# Patient Record
Sex: FEMALE | Race: WHITE | Employment: FULL TIME | URBAN - METROPOLITAN AREA
[De-identification: names, ages, dates, MRNs, and addresses within clinical notes are randomized per-mention and may not be internally consistent; named-entity substitution may affect disease eponyms.]

---

## 2017-02-05 DIAGNOSIS — J45.20 MILD INTERMITTENT ASTHMA WITHOUT COMPLICATION: ICD-10-CM

## 2017-02-05 DIAGNOSIS — J30.9 ALLERGIC RHINITIS: ICD-10-CM

## 2017-02-06 RX ORDER — MONTELUKAST SODIUM 10 MG/1
TABLET ORAL
Qty: 90 TABLET | Refills: 2 | Status: SHIPPED | OUTPATIENT
Start: 2017-02-06 | End: 2017-11-05 | Stop reason: SDUPTHER

## 2017-05-25 ENCOUNTER — OFFICE VISIT (OUTPATIENT)
Dept: FAMILY MEDICINE CLINIC | Age: 32
End: 2017-05-25

## 2017-05-25 VITALS
SYSTOLIC BLOOD PRESSURE: 110 MMHG | OXYGEN SATURATION: 99 % | HEIGHT: 65 IN | HEART RATE: 73 BPM | BODY MASS INDEX: 26.91 KG/M2 | WEIGHT: 161.5 LBS | DIASTOLIC BLOOD PRESSURE: 72 MMHG

## 2017-05-25 DIAGNOSIS — F41.9 ANXIETY: Primary | ICD-10-CM

## 2017-05-25 PROCEDURE — 99213 OFFICE O/P EST LOW 20 MIN: CPT | Performed by: FAMILY MEDICINE

## 2017-05-25 RX ORDER — CITALOPRAM 20 MG/1
20 TABLET ORAL DAILY
Qty: 90 TABLET | Refills: 3 | Status: SHIPPED | OUTPATIENT
Start: 2017-05-25 | End: 2018-05-11 | Stop reason: SDUPTHER

## 2017-05-25 ASSESSMENT — PATIENT HEALTH QUESTIONNAIRE - PHQ9
SUM OF ALL RESPONSES TO PHQ9 QUESTIONS 1 & 2: 2
1. LITTLE INTEREST OR PLEASURE IN DOING THINGS: 1
2. FEELING DOWN, DEPRESSED OR HOPELESS: 1
SUM OF ALL RESPONSES TO PHQ QUESTIONS 1-9: 2

## 2017-11-05 DIAGNOSIS — J30.9 ALLERGIC RHINITIS: ICD-10-CM

## 2017-11-05 DIAGNOSIS — J45.20 MILD INTERMITTENT ASTHMA WITHOUT COMPLICATION: ICD-10-CM

## 2017-11-06 RX ORDER — MONTELUKAST SODIUM 10 MG/1
TABLET ORAL
Qty: 90 TABLET | Refills: 1 | Status: SHIPPED | OUTPATIENT
Start: 2017-11-06 | End: 2018-05-11 | Stop reason: SDUPTHER

## 2018-03-15 ENCOUNTER — OFFICE VISIT (OUTPATIENT)
Dept: FAMILY MEDICINE CLINIC | Age: 33
End: 2018-03-15

## 2018-03-15 VITALS
HEART RATE: 57 BPM | SYSTOLIC BLOOD PRESSURE: 96 MMHG | BODY MASS INDEX: 27.99 KG/M2 | TEMPERATURE: 97.8 F | OXYGEN SATURATION: 97 % | DIASTOLIC BLOOD PRESSURE: 72 MMHG | WEIGHT: 165.6 LBS

## 2018-03-15 DIAGNOSIS — J45.909 UNCOMPLICATED ASTHMA, UNSPECIFIED ASTHMA SEVERITY, UNSPECIFIED WHETHER PERSISTENT: Primary | ICD-10-CM

## 2018-03-15 DIAGNOSIS — K29.70 GASTRITIS WITHOUT BLEEDING, UNSPECIFIED CHRONICITY, UNSPECIFIED GASTRITIS TYPE: ICD-10-CM

## 2018-03-15 DIAGNOSIS — J30.2 SEASONAL ALLERGIC RHINITIS, UNSPECIFIED CHRONICITY, UNSPECIFIED TRIGGER: ICD-10-CM

## 2018-03-15 PROCEDURE — 99213 OFFICE O/P EST LOW 20 MIN: CPT | Performed by: FAMILY MEDICINE

## 2018-03-15 PROCEDURE — G8419 CALC BMI OUT NRM PARAM NOF/U: HCPCS | Performed by: FAMILY MEDICINE

## 2018-03-15 PROCEDURE — G8484 FLU IMMUNIZE NO ADMIN: HCPCS | Performed by: FAMILY MEDICINE

## 2018-03-15 PROCEDURE — G8427 DOCREV CUR MEDS BY ELIG CLIN: HCPCS | Performed by: FAMILY MEDICINE

## 2018-03-15 PROCEDURE — 1036F TOBACCO NON-USER: CPT | Performed by: FAMILY MEDICINE

## 2018-03-15 RX ORDER — RANITIDINE 150 MG/1
150 TABLET ORAL 2 TIMES DAILY
Qty: 60 TABLET | Refills: 0 | Status: SHIPPED | OUTPATIENT
Start: 2018-03-15 | End: 2018-07-18

## 2018-03-15 RX ORDER — ALBUTEROL SULFATE 90 UG/1
2 AEROSOL, METERED RESPIRATORY (INHALATION) EVERY 6 HOURS PRN
Qty: 1 INHALER | Refills: 3 | Status: SHIPPED | OUTPATIENT
Start: 2018-03-15 | End: 2019-07-22 | Stop reason: CLARIF

## 2018-03-15 NOTE — PROGRESS NOTES
She is oriented to person, place, and time. She appears well-developed and well-nourished. No distress. Cardiovascular: Normal rate, regular rhythm and normal heart sounds. No murmur heard. Pulmonary/Chest: Effort normal and breath sounds normal. She has no wheezes. She has no rales. Neurological: She is alert and oriented to person, place, and time. Psychiatric: She has a normal mood and affect. Her behavior is normal.     Assessment:     1. Uncomplicated asthma, unspecified asthma severity, unspecified whether persistent  albuterol sulfate HFA (PROVENTIL HFA) 108 (90 Base) MCG/ACT inhaler   2. Seasonal allergic rhinitis, unspecified chronicity, unspecified trigger     3. Gastritis without bleeding, unspecified chronicity, unspecified gastritis type  ranitidine (ZANTAC) 150 MG tablet     Plan:     Uncertain etiology of symptoms. Improving. Viral URI vs mild asthma exacerbation with increased training or secondary to GERD. Use albuterol prn. Take zantac bid over the next 2 weeks. Use zyrtec and Singulair regularly. Call with update if no continued improvement over the next week.

## 2018-05-11 DIAGNOSIS — J30.9 ALLERGIC RHINITIS, UNSPECIFIED CHRONICITY, UNSPECIFIED SEASONALITY, UNSPECIFIED TRIGGER: ICD-10-CM

## 2018-05-11 DIAGNOSIS — J45.20 MILD INTERMITTENT ASTHMA WITHOUT COMPLICATION: ICD-10-CM

## 2018-05-11 RX ORDER — CITALOPRAM 20 MG/1
20 TABLET ORAL DAILY
Qty: 30 TABLET | Refills: 0 | Status: SHIPPED | OUTPATIENT
Start: 2018-05-11 | End: 2018-06-24 | Stop reason: SDUPTHER

## 2018-05-11 RX ORDER — MONTELUKAST SODIUM 10 MG/1
TABLET ORAL
Qty: 30 TABLET | Refills: 0 | Status: SHIPPED | OUTPATIENT
Start: 2018-05-11 | End: 2018-06-14 | Stop reason: SDUPTHER

## 2018-06-11 DIAGNOSIS — J30.9 ALLERGIC RHINITIS, UNSPECIFIED CHRONICITY, UNSPECIFIED SEASONALITY, UNSPECIFIED TRIGGER: ICD-10-CM

## 2018-06-11 DIAGNOSIS — J45.20 MILD INTERMITTENT ASTHMA WITHOUT COMPLICATION: ICD-10-CM

## 2018-06-11 RX ORDER — MONTELUKAST SODIUM 10 MG/1
TABLET ORAL
Qty: 30 TABLET | Refills: 0 | OUTPATIENT
Start: 2018-06-11

## 2018-06-14 DIAGNOSIS — J30.9 ALLERGIC RHINITIS, UNSPECIFIED CHRONICITY, UNSPECIFIED SEASONALITY, UNSPECIFIED TRIGGER: ICD-10-CM

## 2018-06-14 DIAGNOSIS — J45.20 MILD INTERMITTENT ASTHMA WITHOUT COMPLICATION: ICD-10-CM

## 2018-06-15 RX ORDER — MONTELUKAST SODIUM 10 MG/1
TABLET ORAL
Qty: 30 TABLET | Refills: 12 | Status: SHIPPED | OUTPATIENT
Start: 2018-06-15 | End: 2018-07-18 | Stop reason: SDUPTHER

## 2018-06-26 RX ORDER — CITALOPRAM 20 MG/1
TABLET ORAL
Qty: 30 TABLET | Refills: 0 | Status: SHIPPED | OUTPATIENT
Start: 2018-06-26 | End: 2018-07-18 | Stop reason: SDUPTHER

## 2018-07-16 RX ORDER — CITALOPRAM 20 MG/1
TABLET ORAL
Qty: 30 TABLET | Refills: 0 | OUTPATIENT
Start: 2018-07-16

## 2018-07-18 ENCOUNTER — OFFICE VISIT (OUTPATIENT)
Dept: FAMILY MEDICINE CLINIC | Age: 33
End: 2018-07-18

## 2018-07-18 VITALS
SYSTOLIC BLOOD PRESSURE: 110 MMHG | BODY MASS INDEX: 28.16 KG/M2 | WEIGHT: 169 LBS | OXYGEN SATURATION: 98 % | HEIGHT: 65 IN | DIASTOLIC BLOOD PRESSURE: 60 MMHG | HEART RATE: 62 BPM

## 2018-07-18 DIAGNOSIS — Z00.00 PHYSICAL EXAM, ROUTINE: Primary | ICD-10-CM

## 2018-07-18 DIAGNOSIS — F41.9 ANXIETY: ICD-10-CM

## 2018-07-18 DIAGNOSIS — J30.2 SEASONAL ALLERGIC RHINITIS, UNSPECIFIED CHRONICITY, UNSPECIFIED TRIGGER: ICD-10-CM

## 2018-07-18 DIAGNOSIS — J30.9 ALLERGIC RHINITIS, UNSPECIFIED CHRONICITY, UNSPECIFIED SEASONALITY, UNSPECIFIED TRIGGER: ICD-10-CM

## 2018-07-18 DIAGNOSIS — J45.20 MILD INTERMITTENT ASTHMA WITHOUT COMPLICATION: ICD-10-CM

## 2018-07-18 PROCEDURE — 99395 PREV VISIT EST AGE 18-39: CPT | Performed by: FAMILY MEDICINE

## 2018-07-18 RX ORDER — CITALOPRAM 20 MG/1
TABLET ORAL
Qty: 90 TABLET | Refills: 3 | Status: SHIPPED | OUTPATIENT
Start: 2018-07-18 | End: 2018-07-20 | Stop reason: DRUGHIGH

## 2018-07-18 RX ORDER — MONTELUKAST SODIUM 10 MG/1
10 TABLET ORAL NIGHTLY
Qty: 90 TABLET | Refills: 3 | Status: SHIPPED | OUTPATIENT
Start: 2018-07-18 | End: 2019-07-11 | Stop reason: SDUPTHER

## 2018-07-18 ASSESSMENT — PATIENT HEALTH QUESTIONNAIRE - PHQ9
SUM OF ALL RESPONSES TO PHQ QUESTIONS 1-9: 0
2. FEELING DOWN, DEPRESSED OR HOPELESS: 0
1. LITTLE INTEREST OR PLEASURE IN DOING THINGS: 0
SUM OF ALL RESPONSES TO PHQ9 QUESTIONS 1 & 2: 0

## 2018-07-18 NOTE — PROGRESS NOTES
History and Physical      Zoey Jones  YOB: 1985    Date of Service:  7/18/2018    Chief Complaint:   Zoey Jones is a 34 y.o. female who presents for complete physical examination. HPI:     Patient here for yearly routine visit. Hx of seasonal allergies which trigger asthma. Takes Singulair nightly during spring and summer which helps. Takes zyrtec daily. Uses albuterol prn but this is rare. No trouble with exercise which is running a few miles at least once a week. On Celexa for anxiety. This has helped although she still struggles some with anxiety (work situations, financial stress, etc.)  Seeing a counselor through work starting tomorrow but does not think that she wants to increase Celexa right now. No thoughts of self harm or hurting others.       Wt Readings from Last 3 Encounters:   07/18/18 169 lb (76.7 kg)   03/15/18 165 lb 9.6 oz (75.1 kg)   05/25/17 161 lb 8 oz (73.3 kg)     BP Readings from Last 3 Encounters:   07/18/18 110/60   03/15/18 96/72   05/25/17 110/72       Patient Active Problem List   Diagnosis    Asthma    Allergic rhinitis    Onychomycosis       Preventive Care:  Health Maintenance   Topic Date Due    HIV screen  09/30/1999    DTaP/Tdap/Td vaccine (1 - Tdap) 09/30/2003    Pneumococcal med risk (1 of 1 - PPSV23) 09/30/2003    Cervical cancer screen  09/30/2005    Flu vaccine (1) 09/01/2018        Lipid panel:   Lab Results   Component Value Date    CHOL 166 02/12/2016    TRIG 60 02/12/2016    HDL 58 02/12/2016    LDLCALC 96 02/12/2016        Immunization History   Administered Date(s) Administered    Influenza Whole 10/15/2015       Allergies   Allergen Reactions    Other      SEA WEED  CATS    Penicillins      Outpatient Prescriptions Marked as Taking for the 7/18/18 encounter (Office Visit) with Kemar Woodward MD   Medication Sig Dispense Refill    citalopram (CELEXA) 20 MG tablet TAKE ONE TABLET BY MOUTH DAILY 90 tablet 3   

## 2018-07-19 ENCOUNTER — TELEPHONE (OUTPATIENT)
Dept: FAMILY MEDICINE CLINIC | Age: 33
End: 2018-07-19

## 2018-07-20 RX ORDER — CITALOPRAM 40 MG/1
40 TABLET ORAL DAILY
Qty: 90 TABLET | Refills: 3 | Status: SHIPPED | OUTPATIENT
Start: 2018-07-20 | End: 2019-07-22 | Stop reason: SDUPTHER

## 2018-10-15 ENCOUNTER — OFFICE VISIT (OUTPATIENT)
Dept: FAMILY MEDICINE CLINIC | Age: 33
End: 2018-10-15
Payer: COMMERCIAL

## 2018-10-15 VITALS
BODY MASS INDEX: 29.29 KG/M2 | HEART RATE: 60 BPM | WEIGHT: 176 LBS | OXYGEN SATURATION: 98 % | SYSTOLIC BLOOD PRESSURE: 104 MMHG | DIASTOLIC BLOOD PRESSURE: 70 MMHG

## 2018-10-15 DIAGNOSIS — S99.922A TOE INJURY, LEFT, INITIAL ENCOUNTER: Primary | ICD-10-CM

## 2018-10-15 PROCEDURE — 1036F TOBACCO NON-USER: CPT | Performed by: FAMILY MEDICINE

## 2018-10-15 PROCEDURE — 99213 OFFICE O/P EST LOW 20 MIN: CPT | Performed by: FAMILY MEDICINE

## 2018-10-15 PROCEDURE — G8419 CALC BMI OUT NRM PARAM NOF/U: HCPCS | Performed by: FAMILY MEDICINE

## 2018-10-15 PROCEDURE — G8484 FLU IMMUNIZE NO ADMIN: HCPCS | Performed by: FAMILY MEDICINE

## 2018-10-15 PROCEDURE — G8427 DOCREV CUR MEDS BY ELIG CLIN: HCPCS | Performed by: FAMILY MEDICINE

## 2018-10-15 ASSESSMENT — ENCOUNTER SYMPTOMS: RESPIRATORY NEGATIVE: 1

## 2019-07-11 DIAGNOSIS — J30.9 ALLERGIC RHINITIS: ICD-10-CM

## 2019-07-11 DIAGNOSIS — J45.20 MILD INTERMITTENT ASTHMA WITHOUT COMPLICATION: ICD-10-CM

## 2019-07-11 RX ORDER — MONTELUKAST SODIUM 10 MG/1
10 TABLET ORAL NIGHTLY
Qty: 30 TABLET | Refills: 0 | Status: SHIPPED | OUTPATIENT
Start: 2019-07-11 | End: 2019-07-22 | Stop reason: SDUPTHER

## 2019-07-22 ENCOUNTER — OFFICE VISIT (OUTPATIENT)
Dept: FAMILY MEDICINE CLINIC | Age: 34
End: 2019-07-22
Payer: COMMERCIAL

## 2019-07-22 VITALS
BODY MASS INDEX: 29.26 KG/M2 | HEIGHT: 65 IN | DIASTOLIC BLOOD PRESSURE: 72 MMHG | WEIGHT: 175.6 LBS | SYSTOLIC BLOOD PRESSURE: 108 MMHG | OXYGEN SATURATION: 98 % | HEART RATE: 47 BPM

## 2019-07-22 DIAGNOSIS — Z00.00 PHYSICAL EXAM, ROUTINE: Primary | ICD-10-CM

## 2019-07-22 DIAGNOSIS — I95.9 HYPOTENSION, UNSPECIFIED HYPOTENSION TYPE: ICD-10-CM

## 2019-07-22 DIAGNOSIS — J45.20 MILD INTERMITTENT ASTHMA WITHOUT COMPLICATION: ICD-10-CM

## 2019-07-22 DIAGNOSIS — J45.909 UNCOMPLICATED ASTHMA, UNSPECIFIED ASTHMA SEVERITY, UNSPECIFIED WHETHER PERSISTENT: ICD-10-CM

## 2019-07-22 DIAGNOSIS — F41.9 ANXIETY: ICD-10-CM

## 2019-07-22 DIAGNOSIS — J30.9 ALLERGIC RHINITIS, UNSPECIFIED SEASONALITY, UNSPECIFIED TRIGGER: ICD-10-CM

## 2019-07-22 PROCEDURE — 99395 PREV VISIT EST AGE 18-39: CPT | Performed by: FAMILY MEDICINE

## 2019-07-22 RX ORDER — MONTELUKAST SODIUM 10 MG/1
10 TABLET ORAL NIGHTLY
Qty: 90 TABLET | Refills: 3 | Status: SHIPPED | OUTPATIENT
Start: 2019-07-22 | End: 2020-08-13 | Stop reason: SDUPTHER

## 2019-07-22 RX ORDER — CITALOPRAM 40 MG/1
40 TABLET ORAL DAILY
Qty: 90 TABLET | Refills: 3 | Status: SHIPPED | OUTPATIENT
Start: 2019-07-22 | End: 2020-08-06

## 2019-07-22 RX ORDER — ALBUTEROL SULFATE 90 UG/1
2 AEROSOL, METERED RESPIRATORY (INHALATION) EVERY 6 HOURS PRN
Qty: 1 INHALER | Refills: 3 | Status: SHIPPED | OUTPATIENT
Start: 2019-07-22

## 2019-07-22 RX ORDER — ALCOHOL ANTISEPTIC PADS
PADS, MEDICATED (EA) TOPICAL
COMMUNITY
End: 2020-08-06

## 2019-07-22 ASSESSMENT — PATIENT HEALTH QUESTIONNAIRE - PHQ9
2. FEELING DOWN, DEPRESSED OR HOPELESS: 0
SUM OF ALL RESPONSES TO PHQ9 QUESTIONS 1 & 2: 0
SUM OF ALL RESPONSES TO PHQ QUESTIONS 1-9: 0
SUM OF ALL RESPONSES TO PHQ QUESTIONS 1-9: 0
1. LITTLE INTEREST OR PLEASURE IN DOING THINGS: 0

## 2019-07-22 NOTE — PROGRESS NOTES
Penicillins      Outpatient Medications Marked as Taking for the 7/22/19 encounter (Office Visit) with Deirdre Pearce MD   Medication Sig Dispense Refill    B Complex-Biotin-FA (HM VITAMIN B50 COMPLEX) TABS Take by mouth      montelukast (SINGULAIR) 10 MG tablet Take 1 tablet by mouth nightly 30 tablet 0    citalopram (CELEXA) 40 MG tablet Take 1 tablet by mouth daily 90 tablet 3    [DISCONTINUED] albuterol sulfate HFA (PROVENTIL HFA) 108 (90 Base) MCG/ACT inhaler Inhale 2 puffs into the lungs every 6 hours as needed for Wheezing or Shortness of Breath 1 Inhaler 3    Multiple Vitamin (ONE-A-DAY ESSENTIAL PO) Take by mouth      cetirizine (ZYRTEC) 10 MG tablet Take 10 mg by mouth daily. Past Medical History:   Diagnosis Date    Allergic rhinitis     Asthma      Past Surgical History:   Procedure Laterality Date    URETHRAL STRICTURE DILATATION  1995     No family history on file. Social History     Socioeconomic History    Marital status:      Spouse name: Not on file    Number of children: Not on file    Years of education: Not on file    Highest education level: Not on file   Occupational History    Not on file   Social Needs    Financial resource strain: Not on file    Food insecurity:     Worry: Not on file     Inability: Not on file    Transportation needs:     Medical: Not on file     Non-medical: Not on file   Tobacco Use    Smoking status: Never Smoker    Smokeless tobacco: Never Used   Substance and Sexual Activity    Alcohol use:  Yes     Alcohol/week: 0.0 standard drinks     Comment: Occasionally    Drug use: No    Sexual activity: Yes     Partners: Male     Comment: ; expecting   Lifestyle    Physical activity:     Days per week: Not on file     Minutes per session: Not on file    Stress: Not on file   Relationships    Social connections:     Talks on phone: Not on file     Gets together: Not on file     Attends Church service: Not on file Active member of club or organization: Not on file     Attends meetings of clubs or organizations: Not on file     Relationship status: Not on file    Intimate partner violence:     Fear of current or ex partner: Not on file     Emotionally abused: Not on file     Physically abused: Not on file     Forced sexual activity: Not on file   Other Topics Concern    Not on file   Social History Narrative    2 children ages 11 and 1 in 2018       Review of Systems:  A comprehensive review of systems was negative except for what was noted in the HPI. Physical Exam:   Vitals:    07/22/19 0900   BP: 108/72   Site: Left Upper Arm   Position: Sitting   Cuff Size: Medium Adult   Pulse: (!) 47   SpO2: 98%   Weight: 175 lb 9.6 oz (79.7 kg)   Height: 5' 5.25\" (1.657 m)     Body mass index is 29 kg/m². Constitutional: She is oriented to person, place, and time. She appears well-developed and well-nourished. No distress. HEENT:   Head: Normocephalic and atraumatic. Right Ear: Tympanic membrane, external ear and ear canal normal.   Left Ear: Tympanic membrane, external ear and ear canal normal.   Nose: Nose normal.   Mouth/Throat: Oropharynx is clear and moist, and mucous membranes are normal.  There is no cervical adenopathy. Eyes: Conjunctivae and extraocular motions are normal. Pupils are equal, round, and reactive to light. Neck: Neck supple. Carotid bruit is not present. No mass and no thyromegaly present. Cardiovascular: Normal rate, regular rhythm, normal heart sounds and intact distal pulses. Exam reveals no gallop and no friction rub. No murmur heard. Pulmonary/Chest: Effort normal and breath sounds normal. No respiratory distress. She has no wheezes, rhonchi or rales. Abdominal: Soft, non-tender. Bowel sounds and aorta are normal. She exhibits no organomegaly, mass or bruit. Musculoskeletal: Normal range of motion, no synovitis. She exhibits no edema.    Neurological: She is alert and oriented to person,

## 2019-07-24 LAB
BUN / CREAT RATIO: NORMAL (CALC) (ref 6–22)
BUN BLDV-MCNC: 14 MG/DL (ref 7–25)
CALCIUM SERPL-MCNC: 8.6 MG/DL (ref 8.6–10.2)
CHLORIDE BLD-SCNC: 107 MMOL/L (ref 98–110)
CHOLESTEROL, TOTAL: 187 MG/DL
CHOLESTEROL/HDL RATIO: 2.9 (CALC)
CHOLESTEROL: 122 MG/DL (CALC)
CO2: 28 MMOL/L (ref 20–32)
COMMENT: ABNORMAL
CORTISOL - AM: 10.3 MCG/DL
CREAT SERPL-MCNC: 0.78 MG/DL (ref 0.5–1.1)
GFR AFRICAN AMERICAN: 115 ML/MIN/1.73M2
GFR, ESTIMATED: 99 ML/MIN/1.73M2
GLUCOSE BLD-MCNC: 79 MG/DL (ref 65–99)
HDLC SERPL-MCNC: 65 MG/DL
LDL CHOLESTEROL CALCULATED: 104 MG/DL (CALC)
POTASSIUM SERPL-SCNC: 4 MMOL/L (ref 3.5–5.3)
SODIUM BLD-SCNC: 142 MMOL/L (ref 135–146)
TRIGL SERPL-MCNC: 88 MG/DL
TSH ULTRASENSITIVE: 1.91 MIU/L

## 2019-08-09 ENCOUNTER — OFFICE VISIT (OUTPATIENT)
Dept: FAMILY MEDICINE CLINIC | Age: 34
End: 2019-08-09
Payer: COMMERCIAL

## 2019-08-09 VITALS
WEIGHT: 175 LBS | BODY MASS INDEX: 28.9 KG/M2 | HEART RATE: 56 BPM | OXYGEN SATURATION: 99 % | DIASTOLIC BLOOD PRESSURE: 70 MMHG | SYSTOLIC BLOOD PRESSURE: 102 MMHG

## 2019-08-09 DIAGNOSIS — L72.9 INFECTED CYST OF SKIN: Primary | ICD-10-CM

## 2019-08-09 DIAGNOSIS — L08.9 INFECTED CYST OF SKIN: Primary | ICD-10-CM

## 2019-08-09 PROCEDURE — 99213 OFFICE O/P EST LOW 20 MIN: CPT | Performed by: FAMILY MEDICINE

## 2019-08-09 PROCEDURE — 1036F TOBACCO NON-USER: CPT | Performed by: FAMILY MEDICINE

## 2019-08-09 PROCEDURE — G8419 CALC BMI OUT NRM PARAM NOF/U: HCPCS | Performed by: FAMILY MEDICINE

## 2019-08-09 PROCEDURE — G8427 DOCREV CUR MEDS BY ELIG CLIN: HCPCS | Performed by: FAMILY MEDICINE

## 2019-08-09 RX ORDER — SULFAMETHOXAZOLE AND TRIMETHOPRIM 800; 160 MG/1; MG/1
1 TABLET ORAL 2 TIMES DAILY
Qty: 20 TABLET | Refills: 0 | Status: SHIPPED | OUTPATIENT
Start: 2019-08-09 | End: 2019-08-19

## 2019-08-09 NOTE — PROGRESS NOTES
Patient presents today for facial pain and swelling. Pt noticed 2 days ago the left side of her face was swollen and painful. She denies pain in her teeth, denies jaw pain or sinus pressure. She reports feeling a lump in the left side of her face. States noted as day went on closer to dinner. Taking ibuprofen and helps. Hasn't done anything else. No fever. Hurts when moves mouth. Doesn't hurt inside mouth. No trauma. Never had before. No change in swelling with eating. Feels like swelling getting more. No ear pain, no throat pain. Allergies   Allergen Reactions    Other      SEA WEED  CATS    Penicillins Hives         Body mass index is 28.9 kg/m². Vitals:    08/09/19 0932   BP: 102/70   Site: Left Upper Arm   Position: Sitting   Cuff Size: Medium Adult   Pulse: 56   SpO2: 99%   Weight: 175 lb (79.4 kg)     Wt Readings from Last 3 Encounters:   08/09/19 175 lb (79.4 kg)   07/22/19 175 lb 9.6 oz (79.7 kg)   10/15/18 176 lb (79.8 kg)     PHQ score: PHQ-9 Total Score: 0 (7/22/2019  9:07 AM)      GENERAL:Alert and oriented x 4 NAD, affect appropriate and overweight, well hydrated, well developed. NECK:supple and non tender without mass, no thyromegaly or thyroid nodules, no cervical lymphadenopathy  OP: no redness, no swelling, no exudate, no lesions noted  Left cheek just below cheekbone with 1 cm rubbery mildly tender nodule, no fluctuance  No redness to skin. Mild swelling left cheek         ASSESSMENT AND PLAN:       Savanna Gonzalez was seen today for facial pain. Diagnoses and all orders for this visit:    Infected cyst of skin    Other orders  -     sulfamethoxazole-trimethoprim (BACTRIM DS;SEPTRA DS) 800-160 MG per tablet;  Take 1 tablet by mouth 2 times daily for 10 days      Make appt with plastic surgeon to have removed  Call if worsening sx         Note per GHAZAL Whitlock and Scribe with corrections and edits per Darron Slade MD.  I agree with entirety of note and was present and performed

## 2019-08-09 NOTE — PATIENT INSTRUCTIONS
Plastic Surgeons    Dr. Marek Dai   [phone]  [address], [address]    Dr[address] Gordon [facility] and Reconstructive Surgery  [address] [facility]  [address] [facility], [address]  Phone:   [phone]       Dr. Craig Collado   [phone]  [address] [facility], [address], [address] [facility] and Reconstructive Surgery  [address] [facility], [address] [place] [address], . [address]  Phone: [phone]

## 2019-12-16 ENCOUNTER — OFFICE VISIT (OUTPATIENT)
Dept: FAMILY MEDICINE CLINIC | Age: 34
End: 2019-12-16
Payer: COMMERCIAL

## 2019-12-16 VITALS
BODY MASS INDEX: 29.23 KG/M2 | DIASTOLIC BLOOD PRESSURE: 72 MMHG | HEART RATE: 60 BPM | OXYGEN SATURATION: 98 % | SYSTOLIC BLOOD PRESSURE: 106 MMHG | WEIGHT: 177 LBS

## 2019-12-16 DIAGNOSIS — F41.9 ANXIETY: Primary | ICD-10-CM

## 2019-12-16 DIAGNOSIS — Z23 NEED FOR VACCINATION: ICD-10-CM

## 2019-12-16 PROCEDURE — G8482 FLU IMMUNIZE ORDER/ADMIN: HCPCS | Performed by: FAMILY MEDICINE

## 2019-12-16 PROCEDURE — 1036F TOBACCO NON-USER: CPT | Performed by: FAMILY MEDICINE

## 2019-12-16 PROCEDURE — G8427 DOCREV CUR MEDS BY ELIG CLIN: HCPCS | Performed by: FAMILY MEDICINE

## 2019-12-16 PROCEDURE — 99213 OFFICE O/P EST LOW 20 MIN: CPT | Performed by: FAMILY MEDICINE

## 2019-12-16 PROCEDURE — G8419 CALC BMI OUT NRM PARAM NOF/U: HCPCS | Performed by: FAMILY MEDICINE

## 2019-12-16 PROCEDURE — 90686 IIV4 VACC NO PRSV 0.5 ML IM: CPT | Performed by: FAMILY MEDICINE

## 2019-12-16 PROCEDURE — 90471 IMMUNIZATION ADMIN: CPT | Performed by: FAMILY MEDICINE

## 2019-12-16 RX ORDER — DULOXETIN HYDROCHLORIDE 60 MG/1
60 CAPSULE, DELAYED RELEASE ORAL DAILY
Qty: 30 CAPSULE | Refills: 6 | Status: SHIPPED | OUTPATIENT
Start: 2019-12-16 | End: 2020-07-13

## 2020-07-13 RX ORDER — DULOXETIN HYDROCHLORIDE 60 MG/1
CAPSULE, DELAYED RELEASE ORAL
Qty: 30 CAPSULE | Refills: 0 | Status: SHIPPED | OUTPATIENT
Start: 2020-07-13 | End: 2020-07-14

## 2020-07-14 RX ORDER — DULOXETIN HYDROCHLORIDE 60 MG/1
CAPSULE, DELAYED RELEASE ORAL
Qty: 30 CAPSULE | Refills: 0 | Status: SHIPPED | OUTPATIENT
Start: 2020-07-14 | End: 2020-09-14 | Stop reason: SDUPTHER

## 2020-08-06 ENCOUNTER — OFFICE VISIT (OUTPATIENT)
Dept: FAMILY MEDICINE CLINIC | Age: 35
End: 2020-08-06
Payer: COMMERCIAL

## 2020-08-06 VITALS
HEART RATE: 70 BPM | SYSTOLIC BLOOD PRESSURE: 118 MMHG | TEMPERATURE: 97.5 F | WEIGHT: 185.6 LBS | OXYGEN SATURATION: 99 % | DIASTOLIC BLOOD PRESSURE: 82 MMHG | BODY MASS INDEX: 30.65 KG/M2

## 2020-08-06 LAB
BACTERIA URINE, POC: 0
BILIRUBIN URINE: 0 MG/DL
BLOOD, URINE: POSITIVE
CASTS URINE, POC: 0
CLARITY: CLEAR
COLOR: YELLOW
CRYSTALS URINE, POC: 0
EPI CELLS URINE, POC: 0
GLUCOSE URINE: NEGATIVE
KETONES, URINE: NEGATIVE
LEUKOCYTE EST, POC: POSITIVE
NITRITE, URINE: NEGATIVE
PH UA: 5.5 (ref 4.5–8)
PROTEIN UA: NEGATIVE
RBC URINE, POC: NORMAL
SPECIFIC GRAVITY UA: 1 (ref 1–1.03)
UROBILINOGEN, URINE: NORMAL
WBC URINE, POC: NORMAL
YEAST URINE, POC: 0

## 2020-08-06 PROCEDURE — 81000 URINALYSIS NONAUTO W/SCOPE: CPT | Performed by: NURSE PRACTITIONER

## 2020-08-06 PROCEDURE — 99213 OFFICE O/P EST LOW 20 MIN: CPT | Performed by: NURSE PRACTITIONER

## 2020-08-06 RX ORDER — PHENAZOPYRIDINE HYDROCHLORIDE 200 MG/1
200 TABLET, FILM COATED ORAL 3 TIMES DAILY PRN
Qty: 9 TABLET | Refills: 0 | Status: SHIPPED | OUTPATIENT
Start: 2020-08-06 | End: 2020-08-09

## 2020-08-06 RX ORDER — CIPROFLOXACIN 500 MG/1
500 TABLET, FILM COATED ORAL 2 TIMES DAILY
Qty: 14 TABLET | Refills: 0 | Status: SHIPPED | OUTPATIENT
Start: 2020-08-06 | End: 2020-09-25 | Stop reason: ALTCHOICE

## 2020-08-06 ASSESSMENT — ENCOUNTER SYMPTOMS
DIARRHEA: 0
BACK PAIN: 0
NAUSEA: 0
CONSTIPATION: 0

## 2020-08-06 NOTE — PROGRESS NOTES
Manny Champagne  : 1985  Encounter date: 2020    This con 27 y.o. female who presents with  Chief Complaint   Patient presents with    Dysuria     Pt states having frequency and pain with urination. Patient states that she does notice some odor. x4 days. History of present illness:    HPI Pt is 27year old female for burning with urination, increased frequency that started 4 days ago. Pt reports last UTI . Pt reports significant history as child. Pt denies dehydration. Pt has taken pyridium with mild relief. Pt reports recent intercourse. Current Outpatient Medications on File Prior to Visit   Medication Sig Dispense Refill    DULoxetine (CYMBALTA) 60 MG extended release capsule TAKE ONE CAPSULE BY MOUTH DAILY 30 capsule 0    montelukast (SINGULAIR) 10 MG tablet Take 1 tablet by mouth nightly 90 tablet 3    albuterol sulfate HFA (PROVENTIL HFA) 108 (90 Base) MCG/ACT inhaler Inhale 2 puffs into the lungs every 6 hours as needed for Wheezing or Shortness of Breath 1 Inhaler 3    Multiple Vitamin (ONE-A-DAY ESSENTIAL PO) Take by mouth      cetirizine (ZYRTEC) 10 MG tablet Take 10 mg by mouth daily. No current facility-administered medications on file prior to visit. Allergies   Allergen Reactions    Citalopram      Decreased libido    Other      SEA WEED  CATS    Penicillins Hives     Past Medical History:   Diagnosis Date    Allergic rhinitis     Asthma       Past Surgical History:   Procedure Laterality Date    URETHRAL STRICTURE DILATATION        History reviewed. No pertinent family history. Social History     Tobacco Use    Smoking status: Never Smoker    Smokeless tobacco: Never Used   Substance Use Topics    Alcohol use: Yes     Alcohol/week: 0.0 standard drinks     Comment: Occasionally        Review of Systems   Constitutional: Negative for activity change, appetite change, chills, fatigue and fever.    Gastrointestinal: Negative for constipation, diarrhea and nausea. Genitourinary: Positive for dysuria and urgency. Negative for decreased urine volume, difficulty urinating, flank pain, hematuria, pelvic pain and vaginal discharge. Musculoskeletal: Negative for back pain. Objective:    /82 (Site: Right Upper Arm, Position: Sitting, Cuff Size: Medium Adult)   Pulse 70   Temp 97.5 °F (36.4 °C)   Wt 185 lb 9.6 oz (84.2 kg)   SpO2 99%   BMI 30.65 kg/m²   Weight: 185 lb 9.6 oz (84.2 kg)     BP Readings from Last 3 Encounters:   08/06/20 118/82   12/16/19 106/72   08/09/19 102/70     Wt Readings from Last 3 Encounters:   08/06/20 185 lb 9.6 oz (84.2 kg)   12/16/19 177 lb (80.3 kg)   08/09/19 175 lb (79.4 kg)     BMI Readings from Last 3 Encounters:   08/06/20 30.65 kg/m²   12/16/19 29.23 kg/m²   08/09/19 28.90 kg/m²       Physical Exam  Vitals signs reviewed. Constitutional:       Appearance: Normal appearance. She is well-developed. Cardiovascular:      Rate and Rhythm: Normal rate and regular rhythm. Heart sounds: Normal heart sounds. No murmur. Pulmonary:      Effort: Pulmonary effort is normal.      Breath sounds: Normal breath sounds. Abdominal:      General: Bowel sounds are normal.      Palpations: Abdomen is soft. Tenderness: There is no abdominal tenderness. There is no right CVA tenderness or left CVA tenderness. Musculoskeletal:      Right lower leg: No edema. Left lower leg: No edema. Skin:     General: Skin is warm and dry. Neurological:      Mental Status: She is alert and oriented to person, place, and time. Assessment/Plan    1. Acute cystitis with hematuria  Advised increased hydration  Advised probiotics  - ciprofloxacin (CIPRO) 500 MG tablet; Take 1 tablet by mouth 2 times daily  Dispense: 14 tablet; Refill: 0  - phenazopyridine (PYRIDIUM) 200 MG tablet; Take 1 tablet by mouth 3 times daily as needed for Pain  Dispense: 9 tablet; Refill: 0      No follow-ups on file.     This dictation was generated by voice recognition computer software. Although all attempts are made to edit the dictation for accuracy, there may be errors in the transcription that are not intended.

## 2020-08-08 LAB
ORGANISM: ABNORMAL
URINE CULTURE, ROUTINE: ABNORMAL

## 2020-08-13 RX ORDER — MONTELUKAST SODIUM 10 MG/1
10 TABLET ORAL NIGHTLY
Qty: 30 TABLET | Refills: 0 | Status: SHIPPED | OUTPATIENT
Start: 2020-08-13 | End: 2020-09-14 | Stop reason: SDUPTHER

## 2020-09-14 RX ORDER — DULOXETIN HYDROCHLORIDE 60 MG/1
CAPSULE, DELAYED RELEASE ORAL
Qty: 30 CAPSULE | Refills: 0 | Status: SHIPPED | OUTPATIENT
Start: 2020-09-14 | End: 2020-09-25 | Stop reason: SDUPTHER

## 2020-09-14 RX ORDER — MONTELUKAST SODIUM 10 MG/1
10 TABLET ORAL NIGHTLY
Qty: 30 TABLET | Refills: 0 | Status: SHIPPED | OUTPATIENT
Start: 2020-09-14 | End: 2020-09-25 | Stop reason: SDUPTHER

## 2020-09-14 NOTE — TELEPHONE ENCOUNTER
Patient has appt scheduled 9/25, she states she cannot come in any sooner due to her child having to be quarantined (someone in their school has covid). She wants to know if she can at least have a supply to last until she comes in. Disp  Refills  Start  End     montelukast (SINGULAIR) 10 MG tablet  30 tablet  0  8/13/2020      Sig - Route:  Take 1 tablet by mouth nightly - Oral        Disp  Refills  Start  End     DULoxetine (CYMBALTA) 60 MG extended release capsule  30 capsule  0  7/14/2020      Sig: TAKE ONE CAPSULE BY MOUTH DAILY       Pharmacy:  [facility] [address], [address] [facility] [phone] - F [phone]         Please advise

## 2020-09-25 ENCOUNTER — TELEPHONE (OUTPATIENT)
Dept: FAMILY MEDICINE CLINIC | Age: 35
End: 2020-09-25

## 2020-09-25 ENCOUNTER — OFFICE VISIT (OUTPATIENT)
Dept: FAMILY MEDICINE CLINIC | Age: 35
End: 2020-09-25
Payer: COMMERCIAL

## 2020-09-25 VITALS
BODY MASS INDEX: 29.41 KG/M2 | HEART RATE: 65 BPM | WEIGHT: 183 LBS | DIASTOLIC BLOOD PRESSURE: 72 MMHG | HEIGHT: 66 IN | SYSTOLIC BLOOD PRESSURE: 104 MMHG | OXYGEN SATURATION: 99 % | TEMPERATURE: 98 F

## 2020-09-25 PROCEDURE — 99395 PREV VISIT EST AGE 18-39: CPT | Performed by: FAMILY MEDICINE

## 2020-09-25 RX ORDER — MONTELUKAST SODIUM 10 MG/1
10 TABLET ORAL NIGHTLY
Qty: 90 TABLET | Refills: 3 | Status: SHIPPED | OUTPATIENT
Start: 2020-09-25

## 2020-09-25 RX ORDER — DULOXETIN HYDROCHLORIDE 60 MG/1
CAPSULE, DELAYED RELEASE ORAL
Qty: 90 CAPSULE | Refills: 3 | Status: SHIPPED | OUTPATIENT
Start: 2020-09-25

## 2020-09-25 SDOH — HEALTH STABILITY: MENTAL HEALTH: HOW MANY STANDARD DRINKS CONTAINING ALCOHOL DO YOU HAVE ON A TYPICAL DAY?: 1 OR 2

## 2020-09-25 SDOH — HEALTH STABILITY: MENTAL HEALTH: HOW OFTEN DO YOU HAVE A DRINK CONTAINING ALCOHOL?: 2-4 TIMES A MONTH

## 2020-09-25 ASSESSMENT — PATIENT HEALTH QUESTIONNAIRE - PHQ9
1. LITTLE INTEREST OR PLEASURE IN DOING THINGS: 0
SUM OF ALL RESPONSES TO PHQ QUESTIONS 1-9: 0
SUM OF ALL RESPONSES TO PHQ QUESTIONS 1-9: 0
2. FEELING DOWN, DEPRESSED OR HOPELESS: 0
SUM OF ALL RESPONSES TO PHQ9 QUESTIONS 1 & 2: 0

## 2020-09-25 NOTE — PROGRESS NOTES
montelukast (SINGULAIR) 10 MG tablet; Take 1 tablet by mouth nightly  -Stable, continue current medications. Allergic rhinitis, unspecified seasonality, unspecified trigger  -     montelukast (SINGULAIR) 10 MG tablet; Take 1 tablet by mouth nightly  -Stable, continue current medications. Anxiety  -Stable, continue current medications. Other orders  -     DULoxetine (CYMBALTA) 60 MG extended release capsule; TAKE ONE CAPSULE BY MOUTH DAILY            Return in about 1 year (around 9/25/2021) for Physical - 30 minutes.              Note per GHAZAL Salguero and Scribe with corrections and edits per Essence Peterson MD.  I agree with entirety of note and was present and performed history and physical.  I also confirm that the note above accurately reflects all work, treatment, procedures, and medical decision making performed by me, Essence Peterson MD

## 2020-09-25 NOTE — PATIENT INSTRUCTIONS

## 2020-11-12 ENCOUNTER — OFFICE VISIT (OUTPATIENT)
Dept: FAMILY MEDICINE CLINIC | Age: 35
End: 2020-11-12
Payer: COMMERCIAL

## 2020-11-12 VITALS
HEART RATE: 71 BPM | BODY MASS INDEX: 30.15 KG/M2 | TEMPERATURE: 97.7 F | SYSTOLIC BLOOD PRESSURE: 126 MMHG | WEIGHT: 184 LBS | OXYGEN SATURATION: 99 % | DIASTOLIC BLOOD PRESSURE: 72 MMHG

## 2020-11-12 PROCEDURE — G8427 DOCREV CUR MEDS BY ELIG CLIN: HCPCS | Performed by: FAMILY MEDICINE

## 2020-11-12 PROCEDURE — 1036F TOBACCO NON-USER: CPT | Performed by: FAMILY MEDICINE

## 2020-11-12 PROCEDURE — G8417 CALC BMI ABV UP PARAM F/U: HCPCS | Performed by: FAMILY MEDICINE

## 2020-11-12 PROCEDURE — 99214 OFFICE O/P EST MOD 30 MIN: CPT | Performed by: FAMILY MEDICINE

## 2020-11-12 PROCEDURE — G8484 FLU IMMUNIZE NO ADMIN: HCPCS | Performed by: FAMILY MEDICINE

## 2020-11-12 RX ORDER — MINOCYCLINE HYDROCHLORIDE 100 MG/1
100 CAPSULE ORAL 2 TIMES DAILY
Qty: 60 CAPSULE | Refills: 3 | Status: SHIPPED | OUTPATIENT
Start: 2020-11-12

## 2020-11-12 RX ORDER — TRETINOIN 0.5 MG/G
CREAM TOPICAL
Qty: 45 G | Refills: 2 | Status: SHIPPED | OUTPATIENT
Start: 2020-11-12 | End: 2020-12-12

## 2020-11-12 RX ORDER — DULOXETIN HYDROCHLORIDE 30 MG/1
30 CAPSULE, DELAYED RELEASE ORAL DAILY
Qty: 14 CAPSULE | Refills: 0 | Status: SHIPPED | OUTPATIENT
Start: 2020-11-12

## 2020-11-12 NOTE — PROGRESS NOTES
Davy Hassan is a 37 yo female who presents for worsening acne and hirsutism. Acne: Pt states that she has always had issues with acne, but it has been worsening in the past 2 years and began occurring on her buttocks and legs in addition to her face. She uses face wash with salicylic acid, which does not improve her acne. She does not use any other acne treatments. She has not used any prescription treatments for acne in the past.     Hirsutism: The patient states that she has black hairs that grow on her chin that she plucks frequently. She has noticed more hair loss and thinning in the past year as well. Her mother has a history of PCOS. She has the mirena IUD for the past 6.5 years (last placed in 2019) and does not get a menstrual period anymore since it was placed. She states that her period prior to the IUD it was regular, but very heavy with clots. Anxiety: The patient is currently taking cymbalta for anxiety and is not having symptom relief. She states that she still has a lot of anxious feelings, eye twitching, trouble staying asleep, and is more irritable toward her children. She has taken citalopram in the past which seemed to work better, but switched to cymbalta because of decreased libido. Pt feels her libido has not improved. She wants to wean off of her cymbalta and is not interested in trying another medication at this time. Has number to call [employer] thru work and plans to do that. Obesity: The patient states that despite exercising 3-5x/week, she has been gaining weight since 2017. Last year she lost 16 lbs, but has gained it all back since then. She is currently 184 lbs. Patient's medications, allergies, past medical, surgical, social and family histories were reviewed and updated in the EHR as appropriate. Body mass index is 30.15 kg/m².   Vitals:    11/12/20 0818   BP: 126/72   Site: Left Upper Arm   Position: Sitting   Cuff Size: Medium Adult   Pulse: 71   Temp: 97.7 °F (36.5 °C)   TempSrc: Tympanic   SpO2: 99%   Weight: 184 lb (83.5 kg)     Wt Readings from Last 3 Encounters:   11/12/20 184 lb (83.5 kg)   09/25/20 183 lb (83 kg)   08/06/20 185 lb 9.6 oz (84.2 kg)     PHQ score: No data recorded    GENERAL:Alert and oriented x 4 NAD, obese, well hydrated, well developed. SKIN: acne scars and red inflammatory lesions on the forehead, cheeks, and chin. Few lesions on upper back. Scattered inflammatory lesions on the buttocks and posterior thighs. No hair noted on chin. ASSESSMENT AND PLAN:     Norman Mckinnon was seen today for other. Diagnoses and all orders for this visit:    Acne  - Minocycline 100 mg BID  - Tretinoin 0.5% topical cream for spot treatment to apply at night  - Recommend benzoyl peroxide face wash  - Stressed importance of moisturizing and sun protection as these agents are very drying and increase sensitivity to sun    Hirsutism/ Obesity  - Given clinical picture, evaluation for PCOS with the following labs:         - TSH        - 17-hydroxyprogesterone        - Total testosterone        - CMP    Anxiety  - Pt feels anxiety is not well controlled with cymbalta and wants to wean off  - Her regular dose of cymbalta is 60 mg; she was given rx for cymbalta 30 mg to take daily for 1 week, then decrease to every other day for 1 week before stopping the medication. -f/u with [employer] at work    Screening for diabetes mellitus  - Check Hemoglobin A1C      Return if symptoms worsen or fail to improve.        Alonzo Savage, MS3    Note per Sheyla Delgador,  MS3 student with corrections and edits per Louise Trujillo MD.  I agree with entirety of note and was present and performed history and physical.  I also confirm that the note above accurately reflects all work, treatment, procedures, and medical decision making performed by me, Louise Trujillo MD.

## 2020-11-13 ENCOUNTER — HOSPITAL ENCOUNTER (OUTPATIENT)
Age: 35
Discharge: HOME | End: 2020-11-13
Payer: COMMERCIAL

## 2020-11-13 LAB
A/G RATIO: 1.9 (ref 1.1–2.2)
ALBUMIN SERPL-MCNC: 4.7 G/DL (ref 3.4–5)
ALP BLD-CCNC: 58 U/L (ref 40–129)
ALT SERPL-CCNC: 16 U/L (ref 10–40)
ANION GAP SERPL CALCULATED.3IONS-SCNC: 8 MMOL/L (ref 3–16)
AST SERPL-CCNC: 23 U/L (ref 15–37)
BILIRUB SERPL-MCNC: 0.6 MG/DL (ref 0–1)
BUN BLDV-MCNC: 12 MG/DL (ref 7–20)
CALCIUM SERPL-MCNC: 9.5 MG/DL (ref 8.3–10.6)
CHLORIDE BLD-SCNC: 104 MMOL/L (ref 99–110)
CO2: 28 MMOL/L (ref 21–32)
CREAT SERPL-MCNC: 0.7 MG/DL (ref 0.6–1.1)
ESTIMATED AVERAGE GLUCOSE: 93.9 MG/DL
GFR AFRICAN AMERICAN: >60
GFR NON-AFRICAN AMERICAN: >60
GLOBULIN: 2.5 G/DL
GLUCOSE BLD-MCNC: 92 MG/DL (ref 70–99)
HBA1C MFR BLD: 4.9 %
POTASSIUM SERPL-SCNC: 5 MMOL/L (ref 3.5–5.1)
SODIUM BLD-SCNC: 140 MMOL/L (ref 136–145)
TOTAL PROTEIN: 7.2 G/DL (ref 6.4–8.2)
TSH REFLEX: 1.43 UIU/ML (ref 0.27–4.2)

## 2020-11-13 PROCEDURE — 83498 ASY HYDROXYPROGESTERONE 17-D: CPT

## 2020-11-13 PROCEDURE — 80053 COMPREHEN METABOLIC PANEL: CPT

## 2020-11-13 PROCEDURE — 84443 ASSAY THYROID STIM HORMONE: CPT

## 2020-11-13 PROCEDURE — 84403 ASSAY OF TOTAL TESTOSTERONE: CPT

## 2020-11-13 PROCEDURE — 36415 COLL VENOUS BLD VENIPUNCTURE: CPT

## 2020-11-13 PROCEDURE — 83036 HEMOGLOBIN GLYCOSYLATED A1C: CPT

## 2020-11-17 LAB — 17-OH PROGESTERONE LCMS: 144.47 NG/DL

## 2020-11-18 LAB — TESTOSTERONE TOTAL: 15 NG/DL (ref 20–70)

## 2021-10-08 ENCOUNTER — HOSPITAL ENCOUNTER (OUTPATIENT)
Age: 36
Discharge: HOME | End: 2021-10-08
Payer: COMMERCIAL

## 2021-10-08 LAB
A/G RATIO: 1.7 (ref 1.1–2.2)
ALBUMIN SERPL-MCNC: 4.7 G/DL (ref 3.4–5)
ALP BLD-CCNC: 52 U/L (ref 40–129)
ALT SERPL-CCNC: 12 U/L (ref 10–40)
ANION GAP SERPL CALCULATED.3IONS-SCNC: 11 MMOL/L (ref 3–16)
AST SERPL-CCNC: 18 U/L (ref 15–37)
BILIRUB SERPL-MCNC: 0.5 MG/DL (ref 0–1)
BUN BLDV-MCNC: 16 MG/DL (ref 7–20)
CALCIUM SERPL-MCNC: 9.4 MG/DL (ref 8.3–10.6)
CHLORIDE BLD-SCNC: 105 MMOL/L (ref 99–110)
CHOLESTEROL, TOTAL: 185 MG/DL (ref 0–199)
CO2: 24 MMOL/L (ref 21–32)
CREAT SERPL-MCNC: 0.7 MG/DL (ref 0.6–1.1)
ESTIMATED AVERAGE GLUCOSE: 85.3 MG/DL
GFR AFRICAN AMERICAN: >60
GFR NON-AFRICAN AMERICAN: >60
GLOBULIN: 2.8 G/DL
GLUCOSE BLD-MCNC: 83 MG/DL (ref 70–99)
HBA1C MFR BLD: 4.6 %
HDLC SERPL-MCNC: 67 MG/DL (ref 40–60)
LDL CHOLESTEROL CALCULATED: 109 MG/DL
POTASSIUM SERPL-SCNC: 4.6 MMOL/L (ref 3.5–5.1)
SODIUM BLD-SCNC: 140 MMOL/L (ref 136–145)
TOTAL PROTEIN: 7.5 G/DL (ref 6.4–8.2)
TRIGL SERPL-MCNC: 45 MG/DL (ref 0–150)
VITAMIN D 25-HYDROXY: 26.5 NG/ML
VLDLC SERPL CALC-MCNC: 9 MG/DL

## 2021-10-08 PROCEDURE — 36415 COLL VENOUS BLD VENIPUNCTURE: CPT

## 2021-10-08 PROCEDURE — 80053 COMPREHEN METABOLIC PANEL: CPT

## 2021-10-08 PROCEDURE — 82306 VITAMIN D 25 HYDROXY: CPT

## 2021-10-08 PROCEDURE — 83036 HEMOGLOBIN GLYCOSYLATED A1C: CPT

## 2021-10-08 PROCEDURE — 80061 LIPID PANEL: CPT
